# Patient Record
Sex: FEMALE | Race: WHITE | NOT HISPANIC OR LATINO | Employment: OTHER | URBAN - METROPOLITAN AREA
[De-identification: names, ages, dates, MRNs, and addresses within clinical notes are randomized per-mention and may not be internally consistent; named-entity substitution may affect disease eponyms.]

---

## 2017-08-24 ENCOUNTER — TRANSCRIBE ORDERS (OUTPATIENT)
Dept: ADMINISTRATIVE | Facility: HOSPITAL | Age: 57
End: 2017-08-24

## 2017-08-24 DIAGNOSIS — Z12.31 VISIT FOR SCREENING MAMMOGRAM: Primary | ICD-10-CM

## 2017-10-02 ENCOUNTER — HOSPITAL ENCOUNTER (OUTPATIENT)
Dept: MAMMOGRAPHY | Facility: HOSPITAL | Age: 57
Discharge: HOME | End: 2017-10-02
Attending: FAMILY MEDICINE | Admitting: FAMILY MEDICINE

## 2017-10-02 DIAGNOSIS — Z12.31 VISIT FOR SCREENING MAMMOGRAM: ICD-10-CM

## 2017-10-02 PROCEDURE — 77063 BREAST TOMOSYNTHESIS BI: CPT

## 2017-10-02 PROCEDURE — G0202 SCR MAMMO BI INCL CAD: HCPCS

## 2017-10-09 PROCEDURE — 77067 SCR MAMMO BI INCL CAD: CPT | Performed by: RADIOLOGY

## 2017-10-09 PROCEDURE — 77063 BREAST TOMOSYNTHESIS BI: CPT | Performed by: RADIOLOGY

## 2018-09-24 ENCOUNTER — TRANSCRIBE ORDERS (OUTPATIENT)
Dept: ADMINISTRATIVE | Facility: HOSPITAL | Age: 58
End: 2018-09-24

## 2018-09-24 DIAGNOSIS — Z12.31 VISIT FOR SCREENING MAMMOGRAM: Primary | ICD-10-CM

## 2018-10-16 ENCOUNTER — HOSPITAL ENCOUNTER (OUTPATIENT)
Dept: MAMMOGRAPHY | Facility: HOSPITAL | Age: 58
Discharge: HOME | End: 2018-10-16
Attending: FAMILY MEDICINE | Admitting: FAMILY MEDICINE

## 2018-10-16 DIAGNOSIS — Z12.31 VISIT FOR SCREENING MAMMOGRAM: ICD-10-CM

## 2018-10-16 PROCEDURE — 77067 SCR MAMMO BI INCL CAD: CPT

## 2018-10-16 PROCEDURE — 77067 SCR MAMMO BI INCL CAD: CPT | Performed by: RADIOLOGY

## 2018-10-16 PROCEDURE — 77063 BREAST TOMOSYNTHESIS BI: CPT

## 2018-10-16 PROCEDURE — 77063 BREAST TOMOSYNTHESIS BI: CPT | Performed by: RADIOLOGY

## 2019-10-21 ENCOUNTER — TRANSCRIBE ORDERS (OUTPATIENT)
Dept: ADMINISTRATIVE | Facility: HOSPITAL | Age: 59
End: 2019-10-21

## 2019-10-21 DIAGNOSIS — Z12.31 VISIT FOR SCREENING MAMMOGRAM: Primary | ICD-10-CM

## 2019-10-22 ENCOUNTER — HOSPITAL ENCOUNTER (OUTPATIENT)
Dept: MAMMOGRAPHY | Facility: HOSPITAL | Age: 59
Discharge: HOME | End: 2019-10-22
Admitting: FAMILY MEDICINE

## 2019-10-22 DIAGNOSIS — Z12.31 VISIT FOR SCREENING MAMMOGRAM: ICD-10-CM

## 2019-10-22 PROCEDURE — 77067 SCR MAMMO BI INCL CAD: CPT

## 2019-10-22 PROCEDURE — 77067 SCR MAMMO BI INCL CAD: CPT | Performed by: RADIOLOGY

## 2019-10-22 PROCEDURE — 77063 BREAST TOMOSYNTHESIS BI: CPT | Performed by: RADIOLOGY

## 2019-10-22 PROCEDURE — 77063 BREAST TOMOSYNTHESIS BI: CPT

## 2020-09-21 ENCOUNTER — TRANSCRIBE ORDERS (OUTPATIENT)
Dept: ADMINISTRATIVE | Facility: HOSPITAL | Age: 60
End: 2020-09-21

## 2020-09-21 DIAGNOSIS — Z12.31 VISIT FOR SCREENING MAMMOGRAM: Primary | ICD-10-CM

## 2020-12-18 ENCOUNTER — HOSPITAL ENCOUNTER (OUTPATIENT)
Dept: MAMMOGRAPHY | Facility: HOSPITAL | Age: 60
Discharge: HOME | End: 2020-12-18
Admitting: INTERNAL MEDICINE

## 2020-12-18 DIAGNOSIS — Z12.31 VISIT FOR SCREENING MAMMOGRAM: ICD-10-CM

## 2020-12-18 PROCEDURE — 77067 SCR MAMMO BI INCL CAD: CPT

## 2020-12-18 PROCEDURE — 77063 BREAST TOMOSYNTHESIS BI: CPT

## 2020-12-18 PROCEDURE — 77067 SCR MAMMO BI INCL CAD: CPT | Performed by: RADIOLOGY

## 2020-12-18 PROCEDURE — 77063 BREAST TOMOSYNTHESIS BI: CPT | Performed by: RADIOLOGY

## 2021-11-12 ENCOUNTER — TRANSCRIBE ORDERS (OUTPATIENT)
Dept: ADMINISTRATIVE | Facility: HOSPITAL | Age: 61
End: 2021-11-12

## 2021-11-12 DIAGNOSIS — Z12.31 VISIT FOR SCREENING MAMMOGRAM: Primary | ICD-10-CM

## 2022-01-03 ENCOUNTER — HOSPITAL ENCOUNTER (OUTPATIENT)
Dept: MAMMOGRAPHY | Facility: HOSPITAL | Age: 62
Discharge: HOME | End: 2022-01-03
Admitting: INTERNAL MEDICINE

## 2022-01-03 DIAGNOSIS — Z12.31 VISIT FOR SCREENING MAMMOGRAM: ICD-10-CM

## 2022-01-03 PROCEDURE — 77063 BREAST TOMOSYNTHESIS BI: CPT | Performed by: RADIOLOGY

## 2022-01-03 PROCEDURE — 77063 BREAST TOMOSYNTHESIS BI: CPT

## 2022-01-03 PROCEDURE — 77067 SCR MAMMO BI INCL CAD: CPT

## 2022-01-03 PROCEDURE — 77067 SCR MAMMO BI INCL CAD: CPT | Performed by: RADIOLOGY

## 2022-03-22 ENCOUNTER — TRANSCRIBE ORDERS (OUTPATIENT)
Dept: ADMINISTRATIVE | Facility: HOSPITAL | Age: 62
End: 2022-03-22

## 2022-03-22 DIAGNOSIS — S46.212A BICEPS MUSCLE TEAR, LEFT, INITIAL ENCOUNTER: Primary | ICD-10-CM

## 2022-04-26 ENCOUNTER — HOSPITAL ENCOUNTER (OUTPATIENT)
Dept: MRI IMAGING | Facility: HOSPITAL | Age: 62
Discharge: HOME | End: 2022-04-26
Admitting: INTERNAL MEDICINE

## 2022-04-26 DIAGNOSIS — S46.212A BICEPS MUSCLE TEAR, LEFT, INITIAL ENCOUNTER: ICD-10-CM

## 2022-04-26 PROCEDURE — 73218 MRI UPPER EXTREMITY W/O DYE: CPT

## 2022-06-28 ENCOUNTER — TELEPHONE (OUTPATIENT)
Dept: GENETICS | Facility: HOSPITAL | Age: 62
End: 2022-06-28

## 2022-06-28 NOTE — TELEPHONE ENCOUNTER
Patient returned my call. She confirmed she is bringing a copy of her sister's genetic testing results (completed through [employer]) to her genetic appt on 7/5 2:30pm.

## 2022-07-05 ENCOUNTER — HOSPITAL ENCOUNTER (OUTPATIENT)
Dept: MRI IMAGING | Facility: HOSPITAL | Age: 62
Discharge: HOME | End: 2022-07-05

## 2022-07-05 ENCOUNTER — LAB (OUTPATIENT)
Dept: LAB | Facility: HOSPITAL | Age: 62
End: 2022-07-05

## 2022-07-05 ENCOUNTER — CLINICAL SUPPORT (OUTPATIENT)
Dept: GENETICS | Facility: HOSPITAL | Age: 62
End: 2022-07-05

## 2022-07-05 DIAGNOSIS — Z13.79 GENETIC TESTING: Primary | ICD-10-CM

## 2022-07-05 DIAGNOSIS — Z80.3 FAMILY HISTORY OF MALIGNANT NEOPLASM OF BREAST: ICD-10-CM

## 2022-07-05 LAB — CREAT BLDA-MCNC: 0.7 MG/DL (ref 0.6–1.3)

## 2022-07-05 PROCEDURE — A9577 INJ MULTIHANCE: HCPCS | Performed by: INTERNAL MEDICINE

## 2022-07-05 PROCEDURE — 96040: CPT | Performed by: GENETIC COUNSELOR, MS

## 2022-07-05 PROCEDURE — 77049 MRI BREAST C-+ W/CAD BI: CPT | Performed by: RADIOLOGY

## 2022-07-05 PROCEDURE — 0 GADOBENATE DIMEGLUMINE 529 MG/ML SOLUTION: Performed by: INTERNAL MEDICINE

## 2022-07-05 PROCEDURE — 77049 MRI BREAST C-+ W/CAD BI: CPT

## 2022-07-05 PROCEDURE — 82565 ASSAY OF CREATININE: CPT

## 2022-07-05 RX ADMIN — GADOBENATE DIMEGLUMINE 12 ML: 529 INJECTION, SOLUTION INTRAVENOUS at 08:59

## 2022-07-05 NOTE — PROGRESS NOTES
Kamran Francisco, a 62-year-old female, was referred for genetic counseling due to a family history of breast cancer. She was 12 years old at menarche and is nulliparous. She retains her uterus and ovaries. Her current cancer screening includes annual clinical breast exam, annual mammogram, and colonoscopy every ten years. She had a breast MRI today. She has never had a breast biopsy and she has never had colon polyps. She was interested in discussing her risk for a hereditary cancer syndrome.  Ms. Francisco was interested in pursuing a multigene panel, and therefore the CancerNext panel was ordered through [facility] which analyzes BRCA1/2 and 34 additional genes associated with an increased cancer risk. Results are expected in 2-3 weeks.     FAMILY HISTORY (see attached pedigree):   Sister:    Breast cancer (bilateral), 56; negative BRCA1/2 genetic testing (CancerNext panel)  Sister:    Breast cancer, 55  Mat. Great Aunt:  Breast cancer  Mat. Great Aunt:  Breast cancer  Mat. Great Grandmother: Breast cancer    We do not have medical records confirming the diagnoses in Ms. Francisco’s family.    RISK ASSESSMENT:  Ms. Francisco’s family history of breast cancer led to concern regarding a hereditary cancer syndrome.  She meets NCCN guidelines criteria for BRCA1/2 testing based on her family history of breast cancer in at least three close relatives. The standard approach to genetic testing is through a multigene panel. If genetic testing is negative, Ms. Francisco’s management should be guided by family history. One of Ms. Francisco’s sisters has had negative multigene panel testing, reducing the likelihood for a hereditary cancer syndrome in the family. However, she still meets criteria for testing since other affected relatives have not had genetic testing.     GENETIC COUNSELING (30 minutes):  We reviewed the family history information in detail.  Cases of cancer follow three general patterns: sporadic, familial, and  hereditary.  While most cancer is sporadic, some cases appear to occur in family clusters.  These cases are said to be familial and account for 10-20% of certain cancer cases.  Familial cases may be due to a combination of shared genes and environmental factors among family members.  In even fewer families, the cancer is said to be inherited, and the genes responsible for the cancer are known.      Family histories typical of hereditary cancer syndromes usually include multiple first- and second-degree relatives diagnosed with cancer types that define a syndrome.  These cases tend to be diagnosed at younger-than-expected ages and can be bilateral or multifocal.  The cancer in these families follows an autosomal dominant inheritance pattern, which indicates the likely presence of a mutation in a cancer susceptibility gene.  Children and siblings of an individual believed to carry this mutation have a 50% chance of inheriting that mutation, thereby inheriting the increased risk to develop cancer.  These mutations can be passed down from the maternal or the paternal lineage.    Based on Ms. Francisco’s family history, we discussed that hereditary breast cancer accounts for approximately 5-10% of all cases of breast cancer.  A significant proportion of hereditary breast and ovarian cancer can be attributed to mutations in the BRCA1 and BRCA2 genes.  Mutations in these genes confer an increased risk for breast cancer, ovarian cancer, male breast cancer, prostate cancer, and pancreatic cancer. Women with a BRCA1 or BRCA2 mutation have up to an 87% lifetime risk of breast cancer and up to a 44% risk of ovarian cancer. These genes are not responsible for every case of hereditary breast cancer, and we discussed multigene panels that can evaluate BRCA1/2 and a number of additional cancer related genes simultaneously. The standard approach to genetic testing is via a multigene panel.  Genes included on these panels have varying  degrees of risk associated, and management and screening guidelines vary based on the specific gene.  Hereditary cancer syndromes can demonstrate incomplete penetrance and variable expression within families. There are genes that are evaluated that have been more recently described, and there may be less data regarding the risks and therefore may not have established management guidelines at this time. Based on Ms. Francisco’s family history and her desire to get more information regarding her personal risks she opted to pursue testing through a panel evaluating several other genes known to increase the risk for cancer.    GENETIC TESTING:  The risks, benefits and limitations of genetic testing and implications for clinical management following testing were reviewed. DNA test results can influence decisions regarding screening and prevention.  Genetic testing can have significant psychological implications for both individuals and families. Also discussed was the possibility of employment and insurance discrimination based on genetic test results and the federal and states laws that are in place to prevent this (KARLIE).         We discussed multigene panel testing, which would involve testing BRCA1/2 and an additional 34 genes associated with an increased cancer risk. The benefits and limitations of genetic testing were discussed and Ms. Francisco decided to pursue testing of the genes via the panel. The implications of a positive or negative test result were discussed.  We also discussed the importance of testing on an affected relative.  In cases where an affected relative is not available for testing or not willing to pursue testing, it is appropriate to offer testing to an unaffected individual. We discussed the possibility that, in some cases, genetic test results may be ambiguous due to the identification of a genetic variant. These variants may or may not be associated with an increased cancer risk. With multigene  panel testing, it is not uncommon for a variant of uncertain significance (VUS) to be identified.  If a VUS is identified, testing family members is not recommended and screening recommendations are made based on the family history.  The laboratories that perform genetic testing work to reclassify the VUS and send out an amended report if and when a VUS is reclassified.  The majority of variant findings are ultimately reclassified to a negative result. Given her family history, a negative test result does not eliminate all cancer risk, although the risk would not be as high as it would with positive genetic testing.     PLAN:  Genetic testing via the CancerNext panel through [employer] [facility] was ordered. Results are expected in 2-3 weeks and we will contact Ms. Medford with her results once they are received.      Delmi Wasserman MS, [facility] [place], [facility]  Licensed Certified Genetic Counselor

## 2022-07-08 ENCOUNTER — TELEPHONE (OUTPATIENT)
Dept: MRI IMAGING | Facility: HOSPITAL | Age: 62
End: 2022-07-08

## 2022-07-08 NOTE — TELEPHONE ENCOUNTER
Called patient with MRI Breast results. Recommended Rt Dx MMG and Stereo BX scheduled for 8/4 at 9:00 at the [address]. Pt to arrive 15 min early. Pt not on BT.l Pt expressed understanding and was encouraged to call with any further questions or concerns.

## 2022-07-12 ENCOUNTER — APPOINTMENT (OUTPATIENT)
Dept: ULTRASOUND IMAGING | Facility: HOSPITAL | Age: 62
End: 2022-07-12

## 2022-07-19 ENCOUNTER — DOCUMENTATION (OUTPATIENT)
Dept: GENETICS | Facility: HOSPITAL | Age: 62
End: 2022-07-19

## 2022-07-19 NOTE — PROGRESS NOTES
Kamran Francisco, a 62-year-old female, was referred for genetic counseling due to a family history of breast cancer. 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 She was 12 years old at menarche and is nulliparous. She retains her uterus and ovaries. Her current cancer screening includes annual clinical breast exam, annual mammogram, and colonoscopy every ten years. She had a breast MRI on the same day as her initial genetic counseling appointment. She has never had a breast biopsy and she has never had colon polyps. She was interested in discussing her risk for a hereditary cancer syndrome.  Ms. Francisco was interested in pursuing a multigene panel, and therefore the CancerNext panel was ordered through [employer] which analyzes BRCA1/2 and 34 additional genes associated with an increased cancer risk. The genes on this panel include APC, RIRI, AXIN2, BARD1, BMPR1A, BRCA1, BRCA2, BRIP1, CDH1, CDK4, CDKN2A, CHEK2, DICER1, EPCAM, GREM1, HOXB13, MLH1, MSH2, MSH3, MSH6, MUTYH, NBN, NF1, NTHL1, PALB2, PMS2, POLD1, POLE, PTEN, RAD51C, RAD51D, RECQL, SMAD4, SMARCA4, STK11, and TP53.  Genetic testing was negative for pathogenic mutations in BRCA1/2 and 34 additional genes on the CancerNext panel. These normal results were discussed with Ms. Francisco by telephone on 7/19/22.    FAMILY HISTORY (see attached pedigree):   Sister:    Breast cancer (bilateral), 56; negative BRCA1/2 genetic testing (CancerNext panel)  Sister:    Breast cancer, 55  Mat. Great Aunt:  Breast cancer  Mat. Great Aunt:  Breast cancer  Mat. Great Grandmother: Breast cancer    We do not have medical records confirming the diagnoses in Ms. Francisco’s family.    RISK ASSESSMENT:  Ms. Francisco’s family history of breast cancer led to concern regarding a hereditary cancer syndrome.  She meets NCCN guidelines criteria for BRCA1/2 testing based on her family history of breast cancer in at least three close  relatives. The standard approach to genetic testing is through a multigene panel. If genetic testing is negative, Ms. Francisco’s management should be guided by family history. One of Ms. Francisco’s sisters has had negative multigene panel testing, reducing the likelihood for a hereditary cancer syndrome in the family. However, she still meets criteria for testing since other affected relatives have not had genetic testing.     Because genetic testing was negative, Ms. Francisco’s management should be guided by a family history-based risk assessment. Tyrer-Cuzick, version 8 is able to take into account personal factors and family history when calculating risk for breast cancer. Computer modeling estimates that Ms. Francisco’s lifetime personal risk for developing breast cancer is 20% (Tyrer-Cuzick, v8), compared to the general population risk of 12.5%. A risk of 20% or greater warrants consideration of increased screening per NCCN guidelines.  This risk assessment is based on the family history information provided at the time of the appointment and could change in the future should new information be obtained.    GENETIC COUNSELING:  We reviewed the family history information in detail.  Cases of cancer follow three general patterns: sporadic, familial, and hereditary.  While most cancer is sporadic, some cases appear to occur in family clusters.  These cases are said to be familial and account for 10-20% of certain cancer cases.  Familial cases may be due to a combination of shared genes and environmental factors among family members.  In even fewer families, the cancer is said to be inherited, and the genes responsible for the cancer are known.      Family histories typical of hereditary cancer syndromes usually include multiple first- and second-degree relatives diagnosed with cancer types that define a syndrome.  These cases tend to be diagnosed at younger-than-expected ages and can be bilateral or multifocal.  The  cancer in these families follows an autosomal dominant inheritance pattern, which indicates the likely presence of a mutation in a cancer susceptibility gene.  Children and siblings of an individual believed to carry this mutation have a 50% chance of inheriting that mutation, thereby inheriting the increased risk to develop cancer.  These mutations can be passed down from the maternal or the paternal lineage.    Based on Ms. Francisco’s family history, we discussed that hereditary breast cancer accounts for approximately 5-10% of all cases of breast cancer.  A significant proportion of hereditary breast and ovarian cancer can be attributed to mutations in the BRCA1 and BRCA2 genes.  Mutations in these genes confer an increased risk for breast cancer, ovarian cancer, male breast cancer, prostate cancer, and pancreatic cancer. Women with a BRCA1 or BRCA2 mutation have up to an 87% lifetime risk of breast cancer and up to a 44% risk of ovarian cancer. These genes are not responsible for every case of hereditary breast cancer, and we discussed multigene panels that can evaluate BRCA1/2 and a number of additional cancer related genes simultaneously. The standard approach to genetic testing is via a multigene panel.  Genes included on these panels have varying degrees of risk associated, and management and screening guidelines vary based on the specific gene.  Hereditary cancer syndromes can demonstrate incomplete penetrance and variable expression within families. There are genes that are evaluated that have been more recently described, and there may be less data regarding the risks and therefore may not have established management guidelines at this time. Based on Ms. Francisco’s family history and her desire to get more information regarding her personal risks she opted to pursue testing through a panel evaluating several other genes known to increase the risk for cancer.    GENETIC TESTING:  The risks, benefits and  limitations of genetic testing and implications for clinical management following testing were reviewed. DNA test results can influence decisions regarding screening and prevention.  Genetic testing can have significant psychological implications for both individuals and families. Also discussed was the possibility of employment and insurance discrimination based on genetic test results and the federal and states laws that are in place to prevent this (KARLIE).         We discussed multigene panel testing, which would involve testing BRCA1/2 and an additional 34 genes associated with an increased cancer risk. The benefits and limitations of genetic testing were discussed and Ms. Francisco decided to pursue testing of the genes via the panel. The implications of a positive or negative test result were discussed.  We also discussed the importance of testing on an affected relative.  In cases where an affected relative is not available for testing or not willing to pursue testing, it is appropriate to offer testing to an unaffected individual. We discussed the possibility that, in some cases, genetic test results may be ambiguous due to the identification of a genetic variant. These variants may or may not be associated with an increased cancer risk. With multigene panel testing, it is not uncommon for a variant of uncertain significance (VUS) to be identified.  If a VUS is identified, testing family members is not recommended and screening recommendations are made based on the family history.  The laboratories that perform genetic testing work to reclassify the VUS and send out an amended report if and when a VUS is reclassified.  The majority of variant findings are ultimately reclassified to a negative result. Given her family history, a negative test result does not eliminate all cancer risk, although the risk would not be as high as it would with positive genetic testing.     TEST RESULTS: Genetic testing was negative  for known pathogenic mutations by sequencing, rearrangement testing, and RNA analysis for the 36 genes on the CancerNext panel (see attached).  This negative result greatly lowers, but does not eliminate, the risk of a hereditary cancer syndrome for Ms. Francisco. It is possible that the family history is due to a hereditary cancer syndrome that Ms. Francisco did not happen to inherit. This assessment is based on the information provided at the time of the consultation.     CANCER SCREENING: Options available to individuals with a high lifetime risk for breast cancer were discussed, including increased surveillance. Given her family history, Ms. Francisco is at an increased lifetime risk for breast cancer, which warrants increased surveillance.     Increased surveillance, based on NCCN guidelines, would consist of semi-annual clinical breast exams and monthly self-breast exams starting by age 18 and annual mammography starting 10 years younger than the earliest diagnosis in the family, or by age 40, whichever is earliest. According to an American Cancer Society expert panel and NCCN guidelines, annual breast MRI should be offered to women whose lifetime risk of breast cancer is 20-25 percent or more, typically beginning at the same age as mammography.  Breast cancer chemoprevention is another option that can be considered in the future.  Studies have shown that Tamoxifen and Raloxifene can cut the risk of estrogen receptor positive breast cancer by up to 50% when taken by high-risk women over a 5-year period.  These are not recommended before age 35.  There are risks and side effects associated with these medications; therefore, the risks versus benefits must be considered prior to deciding to take chemopreventative medications. This assessment is based on the family history information provided at the time of the appointment.     PLAN: Genetic counseling remains available to Ms. Francisco. She plans to follow up with her  primary care physician for coordination of increased breast screening. She is encouraged to contact our office with any questions, concerns, or updates to the family history at [phone].      Delmi Juarez MS, [facility] [place], [facility]  Licensed Certified Genetic Counselor      Cc: Kamran Stallworth MD

## 2022-08-04 ENCOUNTER — HOSPITAL ENCOUNTER (OUTPATIENT)
Dept: MAMMOGRAPHY | Facility: HOSPITAL | Age: 62
Discharge: HOME | End: 2022-08-04

## 2022-08-04 ENCOUNTER — APPOINTMENT (OUTPATIENT)
Dept: ULTRASOUND IMAGING | Facility: HOSPITAL | Age: 62
End: 2022-08-04

## 2022-08-04 DIAGNOSIS — Z80.3 FAMILY HISTORY OF MALIGNANT NEOPLASM OF BREAST: ICD-10-CM

## 2022-08-04 DIAGNOSIS — R92.8 ABNORMAL MAGNETIC RESONANCE IMAGING OF BREAST: ICD-10-CM

## 2022-08-04 PROCEDURE — 88305 TISSUE EXAM BY PATHOLOGIST: CPT | Performed by: RADIOLOGY

## 2022-08-04 PROCEDURE — G0279 TOMOSYNTHESIS, MAMMO: HCPCS

## 2022-08-04 PROCEDURE — 77065 DX MAMMO INCL CAD UNI: CPT | Performed by: RADIOLOGY

## 2022-08-04 PROCEDURE — 0 LIDOCAINE 1 % SOLUTION: Performed by: RADIOLOGY

## 2022-08-04 PROCEDURE — 77061 BREAST TOMOSYNTHESIS UNI: CPT | Performed by: RADIOLOGY

## 2022-08-04 PROCEDURE — A4648 IMPLANTABLE TISSUE MARKER: HCPCS

## 2022-08-04 PROCEDURE — 76098 X-RAY EXAM SURGICAL SPECIMEN: CPT

## 2022-08-04 PROCEDURE — 77065 DX MAMMO INCL CAD UNI: CPT

## 2022-08-04 PROCEDURE — 19081 BX BREAST 1ST LESION STRTCTC: CPT | Performed by: RADIOLOGY

## 2022-08-04 RX ORDER — LIDOCAINE HYDROCHLORIDE AND EPINEPHRINE 10; 10 MG/ML; UG/ML
20 INJECTION, SOLUTION INFILTRATION; PERINEURAL ONCE
Status: COMPLETED | OUTPATIENT
Start: 2022-08-04 | End: 2022-08-04

## 2022-08-04 RX ORDER — LIDOCAINE HYDROCHLORIDE 10 MG/ML
5 INJECTION, SOLUTION INFILTRATION; PERINEURAL ONCE
Status: COMPLETED | OUTPATIENT
Start: 2022-08-04 | End: 2022-08-04

## 2022-08-04 RX ADMIN — LIDOCAINE HYDROCHLORIDE AND EPINEPHRINE 11 ML: 10; 10 INJECTION, SOLUTION INFILTRATION; PERINEURAL at 12:09

## 2022-08-04 RX ADMIN — Medication 5 ML: at 11:50

## 2022-08-04 NOTE — PROGRESS NOTES
Alert and orientated. Denies discomfort.,No active bleeding.,Steri-strips not visualized, gauze dressing intact. Ice packs given. Verbalizes and demonstrates understanding of post-care instructions, written copy given.

## 2022-08-05 ENCOUNTER — TELEPHONE (OUTPATIENT)
Dept: MRI IMAGING | Facility: HOSPITAL | Age: 62
End: 2022-08-05

## 2022-08-05 ENCOUNTER — TELEPHONE (OUTPATIENT)
Dept: MAMMOGRAPHY | Facility: HOSPITAL | Age: 62
End: 2022-08-05

## 2022-08-05 LAB
CYTO UR: NORMAL
LAB AP CASE REPORT: NORMAL
LAB AP DIAGNOSIS COMMENT: NORMAL
PATH REPORT.FINAL DX SPEC: NORMAL
PATH REPORT.GROSS SPEC: NORMAL

## 2022-08-05 NOTE — TELEPHONE ENCOUNTER
Patient notified of pathology results and recommendation. Verbalizes understanding. Denies discomfort. Denies signs and symptoms of infection. Questions answered, verbalized understanding.  Patient is aware she will be called with an appointment.

## 2022-09-23 ENCOUNTER — APPOINTMENT (OUTPATIENT)
Dept: MAMMOGRAPHY | Facility: HOSPITAL | Age: 62
End: 2022-09-23

## 2022-09-23 ENCOUNTER — HOSPITAL ENCOUNTER (OUTPATIENT)
Dept: MRI IMAGING | Facility: HOSPITAL | Age: 62
Discharge: HOME | End: 2022-09-23
Admitting: INTERNAL MEDICINE

## 2022-09-23 DIAGNOSIS — R92.8 ABNORMAL FINDINGS ON DIAGNOSTIC IMAGING OF BREAST: ICD-10-CM

## 2022-09-23 LAB — CREAT BLDA-MCNC: 0.9 MG/DL (ref 0.6–1.3)

## 2022-09-23 PROCEDURE — A9577 INJ MULTIHANCE: HCPCS | Performed by: INTERNAL MEDICINE

## 2022-09-23 PROCEDURE — 0 GADOBENATE DIMEGLUMINE 529 MG/ML SOLUTION: Performed by: INTERNAL MEDICINE

## 2022-09-23 PROCEDURE — 82565 ASSAY OF CREATININE: CPT

## 2022-09-23 RX ADMIN — GADOBENATE DIMEGLUMINE 13 ML: 529 INJECTION, SOLUTION INTRAVENOUS at 14:08

## 2023-01-17 ENCOUNTER — TRANSCRIBE ORDERS (OUTPATIENT)
Dept: MAMMOGRAPHY | Facility: HOSPITAL | Age: 63
End: 2023-01-17
Payer: COMMERCIAL

## 2023-01-17 DIAGNOSIS — R92.8 ABNORMAL MAMMOGRAM: Primary | ICD-10-CM

## 2023-01-26 ENCOUNTER — TELEPHONE (OUTPATIENT)
Dept: MRI IMAGING | Facility: HOSPITAL | Age: 63
End: 2023-01-26
Payer: COMMERCIAL

## 2023-01-26 NOTE — TELEPHONE ENCOUNTER
Pt having MMG done 2/23 and will plan on getting her MRI Breast in Aug. 2023. She see Dr. Stallworth in March and will request an order.

## 2023-02-08 ENCOUNTER — HOSPITAL ENCOUNTER (OUTPATIENT)
Dept: MAMMOGRAPHY | Facility: HOSPITAL | Age: 63
Discharge: HOME | End: 2023-02-08
Admitting: INTERNAL MEDICINE
Payer: COMMERCIAL

## 2023-02-08 DIAGNOSIS — R92.8 ABNORMAL MAMMOGRAM: ICD-10-CM

## 2023-02-08 PROCEDURE — 77066 DX MAMMO INCL CAD BI: CPT | Performed by: RADIOLOGY

## 2023-02-08 PROCEDURE — 77066 DX MAMMO INCL CAD BI: CPT

## 2023-02-08 PROCEDURE — 77062 BREAST TOMOSYNTHESIS BI: CPT | Performed by: RADIOLOGY

## 2023-02-08 PROCEDURE — G0279 TOMOSYNTHESIS, MAMMO: HCPCS

## 2023-03-13 ENCOUNTER — TRANSCRIBE ORDERS (OUTPATIENT)
Dept: ADMINISTRATIVE | Facility: HOSPITAL | Age: 63
End: 2023-03-13
Payer: COMMERCIAL

## 2023-03-13 DIAGNOSIS — R92.8 ABNORMAL MRI, BREAST: Primary | ICD-10-CM

## 2023-03-22 ENCOUNTER — HOSPITAL ENCOUNTER (OUTPATIENT)
Dept: MRI IMAGING | Facility: HOSPITAL | Age: 63
Discharge: HOME | End: 2023-03-22
Admitting: INTERNAL MEDICINE
Payer: COMMERCIAL

## 2023-03-22 DIAGNOSIS — R92.8 ABNORMAL MRI, BREAST: ICD-10-CM

## 2023-03-22 LAB — CREAT BLDA-MCNC: 0.7 MG/DL (ref 0.6–1.3)

## 2023-03-22 PROCEDURE — 77049 MRI BREAST C-+ W/CAD BI: CPT | Performed by: RADIOLOGY

## 2023-03-22 PROCEDURE — 77049 MRI BREAST C-+ W/CAD BI: CPT

## 2023-03-22 PROCEDURE — 82565 ASSAY OF CREATININE: CPT

## 2023-03-22 PROCEDURE — 0 GADOBENATE DIMEGLUMINE 529 MG/ML SOLUTION: Performed by: INTERNAL MEDICINE

## 2023-03-22 PROCEDURE — A9577 INJ MULTIHANCE: HCPCS | Performed by: INTERNAL MEDICINE

## 2023-03-22 RX ADMIN — GADOBENATE DIMEGLUMINE 13 ML: 529 INJECTION, SOLUTION INTRAVENOUS at 10:17

## 2023-10-19 ENCOUNTER — TRANSCRIBE ORDERS (OUTPATIENT)
Dept: ADMINISTRATIVE | Facility: HOSPITAL | Age: 63
End: 2023-10-19
Payer: COMMERCIAL

## 2023-10-19 DIAGNOSIS — K63.89 COLONIC MASS: Primary | ICD-10-CM

## 2023-10-27 ENCOUNTER — TRANSCRIBE ORDERS (OUTPATIENT)
Dept: ADMINISTRATIVE | Facility: HOSPITAL | Age: 63
End: 2023-10-27
Payer: COMMERCIAL

## 2023-10-27 DIAGNOSIS — D37.4 NEOPLASM OF UNCERTAIN BEHAVIOR OF COLON: Primary | ICD-10-CM

## 2023-10-27 DIAGNOSIS — K63.89 MASS OF COLON: ICD-10-CM

## 2023-11-21 ENCOUNTER — HOSPITAL ENCOUNTER (OUTPATIENT)
Dept: CT IMAGING | Facility: HOSPITAL | Age: 63
Discharge: HOME | End: 2023-11-21
Admitting: STUDENT IN AN ORGANIZED HEALTH CARE EDUCATION/TRAINING PROGRAM
Payer: COMMERCIAL

## 2023-11-21 DIAGNOSIS — K63.89 MASS OF COLON: ICD-10-CM

## 2023-11-21 LAB — CREAT BLDA-MCNC: 0.9 MG/DL (ref 0.6–1.3)

## 2023-11-21 PROCEDURE — 82565 ASSAY OF CREATININE: CPT

## 2023-11-21 PROCEDURE — 25510000001 IOPAMIDOL 61 % SOLUTION: Performed by: STUDENT IN AN ORGANIZED HEALTH CARE EDUCATION/TRAINING PROGRAM

## 2023-11-21 PROCEDURE — 74177 CT ABD & PELVIS W/CONTRAST: CPT

## 2023-11-21 RX ADMIN — IOPAMIDOL 85 ML: 612 INJECTION, SOLUTION INTRAVENOUS at 16:10

## 2023-12-08 ENCOUNTER — CONSULT (OUTPATIENT)
Dept: ONCOLOGY | Facility: CLINIC | Age: 63
End: 2023-12-08
Payer: COMMERCIAL

## 2023-12-08 ENCOUNTER — LAB (OUTPATIENT)
Dept: LAB | Facility: HOSPITAL | Age: 63
End: 2023-12-08
Payer: COMMERCIAL

## 2023-12-08 VITALS
DIASTOLIC BLOOD PRESSURE: 92 MMHG | SYSTOLIC BLOOD PRESSURE: 164 MMHG | TEMPERATURE: 98.5 F | HEIGHT: 65 IN | WEIGHT: 143 LBS | HEART RATE: 88 BPM | BODY MASS INDEX: 23.82 KG/M2 | OXYGEN SATURATION: 98 %

## 2023-12-08 DIAGNOSIS — K76.9 LIVER LESION: Primary | ICD-10-CM

## 2023-12-08 DIAGNOSIS — M89.9 BONE LESION: ICD-10-CM

## 2023-12-08 DIAGNOSIS — K76.9 LIVER LESION: ICD-10-CM

## 2023-12-08 LAB
ALBUMIN SERPL-MCNC: 4.5 G/DL (ref 3.5–5.2)
ALBUMIN/GLOB SERPL: 1.5 G/DL
ALP SERPL-CCNC: 85 U/L (ref 39–117)
ALT SERPL W P-5'-P-CCNC: 18 U/L (ref 1–33)
ANION GAP SERPL CALCULATED.3IONS-SCNC: 10 MMOL/L (ref 5–15)
AST SERPL-CCNC: 24 U/L (ref 1–32)
BASOPHILS # BLD AUTO: 0.09 10*3/MM3 (ref 0–0.2)
BASOPHILS NFR BLD AUTO: 0.9 % (ref 0–1.5)
BILIRUB SERPL-MCNC: 0.6 MG/DL (ref 0–1.2)
BUN SERPL-MCNC: 15 MG/DL (ref 8–23)
BUN/CREAT SERPL: 18.5 (ref 7–25)
CALCIUM SPEC-SCNC: 9.4 MG/DL (ref 8.6–10.5)
CHLORIDE SERPL-SCNC: 106 MMOL/L (ref 98–107)
CO2 SERPL-SCNC: 27 MMOL/L (ref 22–29)
CREAT SERPL-MCNC: 0.81 MG/DL (ref 0.57–1)
DEPRECATED RDW RBC AUTO: 45.7 FL (ref 37–54)
EGFRCR SERPLBLD CKD-EPI 2021: 81.7 ML/MIN/1.73
EOSINOPHIL # BLD AUTO: 0.3 10*3/MM3 (ref 0–0.4)
EOSINOPHIL NFR BLD AUTO: 3.1 % (ref 0.3–6.2)
ERYTHROCYTE [DISTWIDTH] IN BLOOD BY AUTOMATED COUNT: 13 % (ref 12.3–15.4)
GLOBULIN UR ELPH-MCNC: 3 GM/DL
GLUCOSE SERPL-MCNC: 98 MG/DL (ref 65–99)
HCT VFR BLD AUTO: 41.2 % (ref 34–46.6)
HGB BLD-MCNC: 13 G/DL (ref 12–15.9)
IMM GRANULOCYTES # BLD AUTO: 0.02 10*3/MM3 (ref 0–0.05)
IMM GRANULOCYTES NFR BLD AUTO: 0.2 % (ref 0–0.5)
LYMPHOCYTES # BLD AUTO: 4.01 10*3/MM3 (ref 0.7–3.1)
LYMPHOCYTES NFR BLD AUTO: 41 % (ref 19.6–45.3)
MCH RBC QN AUTO: 30.2 PG (ref 26.6–33)
MCHC RBC AUTO-ENTMCNC: 31.6 G/DL (ref 31.5–35.7)
MCV RBC AUTO: 95.8 FL (ref 79–97)
MONOCYTES # BLD AUTO: 0.95 10*3/MM3 (ref 0.1–0.9)
MONOCYTES NFR BLD AUTO: 9.7 % (ref 5–12)
NEUTROPHILS NFR BLD AUTO: 4.4 10*3/MM3 (ref 1.7–7)
NEUTROPHILS NFR BLD AUTO: 45.1 % (ref 42.7–76)
NRBC BLD AUTO-RTO: 0 /100 WBC (ref 0–0.2)
PLATELET # BLD AUTO: 258 10*3/MM3 (ref 140–450)
PMV BLD AUTO: 11.7 FL (ref 6–12)
POTASSIUM SERPL-SCNC: 3.8 MMOL/L (ref 3.5–5.2)
PROT SERPL-MCNC: 7.5 G/DL (ref 6–8.5)
RBC # BLD AUTO: 4.3 10*6/MM3 (ref 3.77–5.28)
SODIUM SERPL-SCNC: 143 MMOL/L (ref 136–145)
WBC NRBC COR # BLD AUTO: 9.77 10*3/MM3 (ref 3.4–10.8)

## 2023-12-08 PROCEDURE — 36415 COLL VENOUS BLD VENIPUNCTURE: CPT

## 2023-12-08 PROCEDURE — 82378 CARCINOEMBRYONIC ANTIGEN: CPT

## 2023-12-08 PROCEDURE — 80053 COMPREHEN METABOLIC PANEL: CPT

## 2023-12-08 PROCEDURE — 85025 COMPLETE CBC W/AUTO DIFF WBC: CPT

## 2023-12-08 PROCEDURE — 99204 OFFICE O/P NEW MOD 45 MIN: CPT | Performed by: INTERNAL MEDICINE

## 2023-12-08 RX ORDER — FLUOXETINE 10 MG/1
CAPSULE ORAL
COMMUNITY
Start: 2023-01-07

## 2023-12-08 RX ORDER — ATORVASTATIN CALCIUM 10 MG/1
TABLET, FILM COATED ORAL
COMMUNITY
Start: 2023-07-07

## 2023-12-08 NOTE — PROGRESS NOTES
New Patient Office Visit      Date: 2023     Patient Name: Kamran Francisco  MRN: [id number]  : 1960  Referring Physician: Robert Briones    Chief Complaint: Establish care for liver lesions and right hip lesion    History of Present Illness: Kamran Francisco is a pleasant 63 y.o. female with a past medical history of hyperlipidemia and anxiety who presents today for evaluation of liver and bone lesion. The patient is accompanied by their sister who contributes to the history of their care.  Patient underwent screening colonoscopy in 2023.  There was a abnormal appearance of the right colon with 1 fold distal to the ileocecal valve was concerning for submucosal lesion.  She underwent a CT abdomen/pelvis which noted 3 sclerotic lesions in the right iliac wing as well as some small lesions in the liver that were indeterminate for potentially hemangiomas versus potentially metastasis.  She is anxious about the scans but otherwise doing well.  Denies any pain or discomfort.  Denies any significant smoking history.  Up-to-date on mammograms and high risk breast MRIs.  Otherwise she feels well has no major concerns or complaints    Oncology History:    Oncology/Hematology History    No history exists.       Subjective      Review of Systems:     Constitutional: Negative for fevers, chills, or weight loss  Eyes: Negative for blurred vision or discharge         Ear/Nose/Throat: Negative for difficulty swallowing, sore throat, LAD                                                       Respiratory: Negative for cough, SOA, wheezing                                                                                        Cardiovascular: Negative for chest pain or palpitations                                                                  Gastrointestinal: Negative for nausea, vomiting or diarrhea                                                                     Genitourinary: Negative for  "dysuria or hematuria                                                                                           Musculoskeletal: Negative for any joint pains or muscle aches                                                                        Neurologic: Negative for any weakness, headaches, dizziness                                                                         Hematologic: Negative for any easy bleeding or bruising                                                                                   Psychiatric: Negative for anxiety or depression                             Past Medical History: No past medical history on file.    Past Surgical History:   Past Surgical History:   Procedure Laterality Date    BREAST BIOPSY Right 08/04/2022    Stereo bx       Family History:   Family History   Problem Relation Age of Onset    Breast cancer Sister 52    Breast cancer Sister 55    Breast cancer Maternal Great-Grandmother         DX AGE UNKNOWN    Ovarian cancer Neg Hx        Social History:   Social History     Socioeconomic History    Marital status:    Tobacco Use    Smoking status: Former     Types: Cigarettes    Smokeless tobacco: Never   Vaping Use    Vaping Use: Never used   Substance and Sexual Activity    Alcohol use: Yes     Comment: rare    Drug use: Defer    Sexual activity: Defer       Medications:     Current Outpatient Medications:     atorvastatin (LIPITOR) 10 MG tablet, , Disp: , Rfl:     FLUoxetine (PROzac) 10 MG capsule, , Disp: , Rfl:     Allergies:   No Known Allergies    Objective     Physical Exam:  Vital Signs:   Vitals:    12/08/23 0812   BP: 164/92   Pulse: 88   Temp: 98.5 °F (36.9 °C)   TempSrc: Temporal   SpO2: 98%   Weight: 64.9 kg (143 lb)   Height: 165.1 cm (65\")  Comment: per pt   PainSc: 0-No pain     Pain Score    12/08/23 0812   PainSc: 0-No pain     ECOG Performance Status: 0 - Asymptomatic    Constitutional: NAD, ECOG 0  Eyes: PERRLA, scleral anicteric  ENT: No LAD, no " thyromegaly  Respiratory: CTAB, no wheezing, rales, rhonchi  Cardiovascular: RRR, no murmurs, pulses 2+ bilaterally  Abdomen: soft, NT/ND, no HSM  Musculoskeletal: strength 5/5 bilaterally, no c/c/e  Neurologic: A&O x 3, CN II-XII intact grossly  Psych: mood and affect congruent, no SI or HI    Results Review:   No visits with results within 2 Week(s) from this visit.   Latest known visit with results is:   Hospital Outpatient Visit on 11/21/2023   Component Date Value Ref Range Status    Creatinine 11/21/2023 0.90  0.60 - 1.30 mg/dL Final    Serial Number: [id number]:  [id number]       CT Abdomen Pelvis With Contrast    Result Date: 11/22/2023  Narrative: CT ABDOMEN PELVIS W CONTRAST Date of Exam: 11/21/2023 3:55 PM EST Indication: D37.4. Colonic mass on CT colonoscopy. Comparison: None available. Technique: Axial CT images were obtained of the abdomen and pelvis following the uneventful intravenous administration of Isovue-300, 85 mL and Redicat 450 mL orally. Reconstructed coronal and sagittal images were also obtained. Automated exposure control and iterative construction methods were used. Findings: Lung Bases: The visualized lung bases and lower mediastinal structures are unremarkable. Peritoneum: No free intraperitoneal air or fluid. Abdominal wall: Unremarkable. Liver: The liver is within normal limits in size and contour. There is a 1.3 cm homogeneously hyperenhancing lesion in the posterior hepatic lobe, and the inferior portion of hepatic segment 7. More inferiorly, in segment 6, there is a second homogeneously hyperenhancing lesion measuring 1 cm. There is adjacent capsular retraction. Surrounding poorly defined hyperenhancement is also visualized possibly on the basis of altered perfusion. There is a third exophytic subcapsular slightly hypoenhancing lesion off of the posterior aspect of the right hepatic lobe measuring 2.3 cm. The portal vein is patent. Biliary/Gallbladder: 1.8 cm gallstone is  visualized. No pericholecystic inflammatory changes are visualized. The gallbladder is not abnormally distended. The biliary tree is nondilated. Pancreas: Pancreas is within normal limits. There is no evidence of pancreatic mass or peripancreatic fluid. Spleen: The spleen is either surgically absent or very small in size. Splenule versus residual small spleen is visualized in the left upper quadrant measuring 2.1 cm. Gastrointestinal/Mesentery: No evidence of bowel obstruction or gross inflammatory changes.The appendix appears within normal limits. No evidence of mesenteric lymphadenopathy visualized on CT. Adrenals: Adrenal glands are unremarkable. Kidneys: The kidneys are in anatomic position. No evidence of nephrolithiasis. No evidence of hydronephrosis or perinephric fat stranding. The kidneys demonstrate symmetric fusion of IV contrast. The ureters are normal in caliber. No filling defect in the urinary  collecting system. Bladder: The urinary bladder is unremarkable. Reproductive organs:  The uterus and ovaries are within normal limits. Retroperitoneal/Lymph Nodes/Vasculature: No retroperitoneal adenopathy is identified. The abdominal vasculature is unremarkable.   Bony Structures:  Sclerotic lesion is visualized in the anterior right iliac wing measures 1.2 cm. Additional 2 subcentimeter sclerotic lesions are are also visualized in the right iliac wing. No lytic lesions are visualized. No acute fracture is visualized.     Impression: Impression: 1.3 cm and 1 cm homogeneously hyperenhancing lesions in the posterior hepatic lobe, segment 6 and 7 as described above. These may represent small flash filling hemangiomas. Hypervascular metastasis cannot be excluded. There is an additional slightly hyperenhancing 2.3 cm exophytic subcapsular lesion off of the posterior right hepatic lobe, indeterminate. Recommend correlation with contrast-enhanced hepatic protocol MRI and/or PET/CT for further evaluation.  Cholelithiasis. 3 sclerotic lesions in the right iliac wing measure up to 1.2 cm. Cannot exclude blastic metastatic disease. Follow-up with bone scan or PET/CT suggested. Electronically Signed: Yonatan Villa MD  11/22/2023 4:04 PM EST  Workstation ID: [id number]     Assessment / Plan      Assessment/Plan:   1. Liver lesion (Primary)  -Noted incidentally on recent CT scan  -Unclear if these are hemangiomas versus metastatic deposit  -Discussed the case with Dr. Briones who felt after reviewing the CT scans that the abnormality on her colonoscopy was likely related to a lipoma  -Will get MRI abdomen to further evaluate the liver lesions and may need potential biopsy based off results  -Will get CBC, CMP, CEA today  -     CBC & Differential; Future  -     CEA; Future  -     Comprehensive Metabolic Panel; Future  -     MRI Abdomen With & Without Contrast; Future    2. Bone lesion  -Unclear etiology on recent CT scan  -Will get MRI of the pelvis to further evaluate the lesions  -May need to consider biopsy in the future  -     MRI Pelvis With & Without Contrast; Future           Follow Up:   Follow-up after scans     Derek Bowman MD  Hematology and Oncology     Please note that portions of this note may have been completed with a voice recognition program. Efforts were made to edit the dictations, but occasionally words are mistranscribed.

## 2023-12-09 LAB — CEA SERPL-MCNC: 0.79 NG/ML

## 2023-12-16 ENCOUNTER — HOSPITAL ENCOUNTER (OUTPATIENT)
Dept: MRI IMAGING | Facility: HOSPITAL | Age: 63
Discharge: HOME | End: 2023-12-16
Payer: COMMERCIAL

## 2023-12-16 DIAGNOSIS — M89.9 BONE LESION: ICD-10-CM

## 2023-12-16 DIAGNOSIS — K76.9 LIVER LESION: ICD-10-CM

## 2023-12-16 PROCEDURE — 74183 MRI ABD W/O CNTR FLWD CNTR: CPT

## 2023-12-16 PROCEDURE — 0 GADOBENATE DIMEGLUMINE 529 MG/ML SOLUTION: Performed by: INTERNAL MEDICINE

## 2023-12-16 PROCEDURE — 72197 MRI PELVIS W/O & W/DYE: CPT

## 2023-12-16 PROCEDURE — A9577 INJ MULTIHANCE: HCPCS | Performed by: INTERNAL MEDICINE

## 2023-12-16 RX ADMIN — GADOBENATE DIMEGLUMINE 12 ML: 529 INJECTION, SOLUTION INTRAVENOUS at 09:09

## 2023-12-17 ENCOUNTER — HOSPITAL ENCOUNTER (OUTPATIENT)
Dept: MRI IMAGING | Facility: HOSPITAL | Age: 63
Discharge: HOME | End: 2023-12-17
Payer: COMMERCIAL

## 2023-12-18 ENCOUNTER — OFFICE VISIT (OUTPATIENT)
Dept: ONCOLOGY | Facility: CLINIC | Age: 63
End: 2023-12-18
Payer: COMMERCIAL

## 2023-12-18 VITALS
HEIGHT: 65 IN | DIASTOLIC BLOOD PRESSURE: 89 MMHG | HEART RATE: 80 BPM | SYSTOLIC BLOOD PRESSURE: 163 MMHG | WEIGHT: 137 LBS | TEMPERATURE: 97.9 F | BODY MASS INDEX: 22.82 KG/M2

## 2023-12-18 DIAGNOSIS — R93.5 ABNORMAL MRI OF ABDOMEN: Primary | ICD-10-CM

## 2023-12-18 PROCEDURE — 99214 OFFICE O/P EST MOD 30 MIN: CPT | Performed by: INTERNAL MEDICINE

## 2023-12-18 NOTE — PROGRESS NOTES
Follow Up Office Visit      Date: 2023     Patient Name: Kamran Francisco  MRN: [id number]  : 1960  Referring Physician: Robert Briones     Chief Complaint: Follow-up for liver lesions and right hip lesion     History of Present Illness: Kamran Francisco is a pleasant 63 y.o. female with a past medical history of hyperlipidemia and anxiety who presents today for evaluation of liver and bone lesion. The patient is accompanied by their sister who contributes to the history of their care.  Patient underwent screening colonoscopy in 2023.  There was a abnormal appearance of the right colon with 1 fold distal to the ileocecal valve was concerning for submucosal lesion.  She underwent a CT abdomen/pelvis which noted 3 sclerotic lesions in the right iliac wing as well as some small lesions in the liver that were indeterminate for potentially hemangiomas versus potentially metastasis.  She is anxious about the scans but otherwise doing well.  Denies any pain or discomfort.  Denies any significant smoking history.  Up-to-date on mammograms and high risk breast MRIs.  Otherwise she feels well has no major concerns or complaints    Interval History:  Presents clinic for follow-up.  Continues to do well.  Denies any abdominal pain or discomfort.  Denies any bone pain or discomfort    Oncology History:    Oncology/Hematology History    No history exists.       Subjective      Review of Systems:   Constitutional: Negative for fevers, chills, or weight loss  Eyes: Negative for blurred vision or discharge         Ear/Nose/Throat: Negative for difficulty swallowing, sore throat, LAD                                                       Respiratory: Negative for cough, SOA, wheezing                                                                                        Cardiovascular: Negative for chest pain or palpitations                                                                 "  Gastrointestinal: Negative for nausea, vomiting or diarrhea                                                                     Genitourinary: Negative for dysuria or hematuria                                                                                           Musculoskeletal: Negative for any joint pains or muscle aches                                                                        Neurologic: Negative for any weakness, headaches, dizziness                                                                         Hematologic: Negative for any easy bleeding or bruising                                                                                   Psychiatric: Negative for anxiety or depression                          Past Medical History/Past Surgical History/ Family History/ Social History: Reviewed by me and unchanged from my previous documentation done on December 2023.     Medications:     Current Outpatient Medications:     atorvastatin (LIPITOR) 10 MG tablet, , Disp: , Rfl:     FLUoxetine (PROzac) 10 MG capsule, , Disp: , Rfl:     Allergies:   No Known Allergies    Objective     Physical Exam:  Vital Signs:   Vitals:    12/18/23 1516   BP: 163/89   Pulse: 80   Temp: 97.9 °F (36.6 °C)   TempSrc: Infrared   Weight: 62.1 kg (137 lb)   Height: 165.1 cm (65\")   PainSc: 0-No pain     Pain Score    12/18/23 1516   PainSc: 0-No pain     ECOG Performance Status: 0 - Asymptomatic    Constitutional: NAD, ECOG 0  Eyes: PERRLA, scleral anicteric  ENT: No LAD, no thyromegaly  Respiratory: CTAB, no wheezing, rales, rhonchi  Cardiovascular: RRR, no murmurs, pulses 2+ bilaterally  Abdomen: soft, NT/ND, no HSM  Musculoskeletal: strength 5/5 bilaterally, no c/c/e  Neurologic: A&O x 3, CN II-XII intact grossly    Results Review:   Lab on 12/08/2023   Component Date Value Ref Range Status    CEA 12/08/2023 0.79  ng/mL Final    Glucose 12/08/2023 98  65 - 99 mg/dL Final    BUN 12/08/2023 15  8 - 23 mg/dL Final    " Creatinine 12/08/2023 0.81  0.57 - 1.00 mg/dL Final    Sodium 12/08/2023 143  136 - 145 mmol/L Final    Potassium 12/08/2023 3.8  3.5 - 5.2 mmol/L Final    Chloride 12/08/2023 106  98 - 107 mmol/L Final    CO2 12/08/2023 27.0  22.0 - 29.0 mmol/L Final    Calcium 12/08/2023 9.4  8.6 - 10.5 mg/dL Final    Total Protein 12/08/2023 7.5  6.0 - 8.5 g/dL Final    Albumin 12/08/2023 4.5  3.5 - 5.2 g/dL Final    ALT (SGPT) 12/08/2023 18  1 - 33 U/L Final    AST (SGOT) 12/08/2023 24  1 - 32 U/L Final    Alkaline Phosphatase 12/08/2023 85  39 - 117 U/L Final    Total Bilirubin 12/08/2023 0.6  0.0 - 1.2 mg/dL Final    Globulin 12/08/2023 3.0  gm/dL Final    Calculated Result    A/G Ratio 12/08/2023 1.5  g/dL Final    BUN/Creatinine Ratio 12/08/2023 18.5  7.0 - 25.0 Final    Anion Gap 12/08/2023 10.0  5.0 - 15.0 mmol/L Final    eGFR 12/08/2023 81.7  >60.0 mL/min/1.73 Final    WBC 12/08/2023 9.77  3.40 - 10.80 10*3/mm3 Final    RBC 12/08/2023 4.30  3.77 - 5.28 10*6/mm3 Final    Hemoglobin 12/08/2023 13.0  12.0 - 15.9 g/dL Final    Hematocrit 12/08/2023 41.2  34.0 - 46.6 % Final    MCV 12/08/2023 95.8  79.0 - 97.0 fL Final    MCH 12/08/2023 30.2  26.6 - 33.0 pg Final    MCHC 12/08/2023 31.6  31.5 - 35.7 g/dL Final    RDW 12/08/2023 13.0  12.3 - 15.4 % Final    RDW-SD 12/08/2023 45.7  37.0 - 54.0 fl Final    MPV 12/08/2023 11.7  6.0 - 12.0 fL Final    Platelets 12/08/2023 258  140 - 450 10*3/mm3 Final    Neutrophil % 12/08/2023 45.1  42.7 - 76.0 % Final    Lymphocyte % 12/08/2023 41.0  19.6 - 45.3 % Final    Monocyte % 12/08/2023 9.7  5.0 - 12.0 % Final    Eosinophil % 12/08/2023 3.1  0.3 - 6.2 % Final    Basophil % 12/08/2023 0.9  0.0 - 1.5 % Final    Immature Grans % 12/08/2023 0.2  0.0 - 0.5 % Final    Neutrophils, Absolute 12/08/2023 4.40  1.70 - 7.00 10*3/mm3 Final    Lymphocytes, Absolute 12/08/2023 4.01 (H)  0.70 - 3.10 10*3/mm3 Final    Monocytes, Absolute 12/08/2023 0.95 (H)  0.10 - 0.90 10*3/mm3 Final    Eosinophils,  Absolute 12/08/2023 0.30  0.00 - 0.40 10*3/mm3 Final    Basophils, Absolute 12/08/2023 0.09  0.00 - 0.20 10*3/mm3 Final    Immature Grans, Absolute 12/08/2023 0.02  0.00 - 0.05 10*3/mm3 Final    nRBC 12/08/2023 0.0  0.0 - 0.2 /100 WBC Final       CT Abdomen Pelvis With Contrast    Result Date: 11/22/2023  Narrative: CT ABDOMEN PELVIS W CONTRAST Date of Exam: 11/21/2023 3:55 PM EST Indication: D37.4. Colonic mass on CT colonoscopy. Comparison: None available. Technique: Axial CT images were obtained of the abdomen and pelvis following the uneventful intravenous administration of Isovue-300, 85 mL and Redicat 450 mL orally. Reconstructed coronal and sagittal images were also obtained. Automated exposure control and iterative construction methods were used. Findings: Lung Bases: The visualized lung bases and lower mediastinal structures are unremarkable. Peritoneum: No free intraperitoneal air or fluid. Abdominal wall: Unremarkable. Liver: The liver is within normal limits in size and contour. There is a 1.3 cm homogeneously hyperenhancing lesion in the posterior hepatic lobe, and the inferior portion of hepatic segment 7. More inferiorly, in segment 6, there is a second homogeneously hyperenhancing lesion measuring 1 cm. There is adjacent capsular retraction. Surrounding poorly defined hyperenhancement is also visualized possibly on the basis of altered perfusion. There is a third exophytic subcapsular slightly hypoenhancing lesion off of the posterior aspect of the right hepatic lobe measuring 2.3 cm. The portal vein is patent. Biliary/Gallbladder: 1.8 cm gallstone is visualized. No pericholecystic inflammatory changes are visualized. The gallbladder is not abnormally distended. The biliary tree is nondilated. Pancreas: Pancreas is within normal limits. There is no evidence of pancreatic mass or peripancreatic fluid. Spleen: The spleen is either surgically absent or very small in size. Splenule versus residual small  spleen is visualized in the left upper quadrant measuring 2.1 cm. Gastrointestinal/Mesentery: No evidence of bowel obstruction or gross inflammatory changes.The appendix appears within normal limits. No evidence of mesenteric lymphadenopathy visualized on CT. Adrenals: Adrenal glands are unremarkable. Kidneys: The kidneys are in anatomic position. No evidence of nephrolithiasis. No evidence of hydronephrosis or perinephric fat stranding. The kidneys demonstrate symmetric fusion of IV contrast. The ureters are normal in caliber. No filling defect in the urinary  collecting system. Bladder: The urinary bladder is unremarkable. Reproductive organs:  The uterus and ovaries are within normal limits. Retroperitoneal/Lymph Nodes/Vasculature: No retroperitoneal adenopathy is identified. The abdominal vasculature is unremarkable.   Bony Structures:  Sclerotic lesion is visualized in the anterior right iliac wing measures 1.2 cm. Additional 2 subcentimeter sclerotic lesions are are also visualized in the right iliac wing. No lytic lesions are visualized. No acute fracture is visualized.     Impression: Impression: 1.3 cm and 1 cm homogeneously hyperenhancing lesions in the posterior hepatic lobe, segment 6 and 7 as described above. These may represent small flash filling hemangiomas. Hypervascular metastasis cannot be excluded. There is an additional slightly hyperenhancing 2.3 cm exophytic subcapsular lesion off of the posterior right hepatic lobe, indeterminate. Recommend correlation with contrast-enhanced hepatic protocol MRI and/or PET/CT for further evaluation. Cholelithiasis. 3 sclerotic lesions in the right iliac wing measure up to 1.2 cm. Cannot exclude blastic metastatic disease. Follow-up with bone scan or PET/CT suggested. Electronically Signed: Yonatan Villa MD  11/22/2023 4:04 PM EST  Workstation ID: [id number]     Assessment / Plan      Assessment/Plan:   1. Abnormal MRI of abdomen (Primary)/2. Liver lesion  (Primary)  -Noted incidentally on recent CT scan  -Unclear if these are hemangiomas versus metastatic deposit  -Discussed the case with Dr. Briones who felt after reviewing the CT scans that the abnormality on her colonoscopy was likely related to a lipoma  -CEA within normal limits  -MRI abdomen discussed with Dr. Bailey and her liver lesions appear to be more hemangioma ache-like.  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 Bone lesion  -Unclear etiology on recent CT scan  -MRI most consistent with a bone island and nonconcerning for malignancy  -Will await official radiology read but unlikely will she need a biopsy or further workup       Follow Up:   Follow-up pending official radiology result     Derek Bowman MD  Hematology and Oncology     Please note that portions of this note may have been completed with a voice recognition program. Efforts were made to edit the dictations, but occasionally words are mistranscribed.

## 2023-12-21 DIAGNOSIS — R93.5 ABNORMAL MRI OF ABDOMEN: Primary | ICD-10-CM

## 2024-02-12 ENCOUNTER — TRANSCRIBE ORDERS (OUTPATIENT)
Dept: ADMINISTRATIVE | Facility: HOSPITAL | Age: 64
End: 2024-02-12
Payer: COMMERCIAL

## 2024-02-12 DIAGNOSIS — Z12.39 BREAST CANCER SCREENING, HIGH RISK PATIENT: Primary | ICD-10-CM

## 2024-02-13 ENCOUNTER — TRANSCRIBE ORDERS (OUTPATIENT)
Dept: ADMINISTRATIVE | Facility: HOSPITAL | Age: 64
End: 2024-02-13
Payer: COMMERCIAL

## 2024-02-13 DIAGNOSIS — Z12.39 BREAST CANCER SCREENING, HIGH RISK PATIENT: Primary | ICD-10-CM

## 2024-02-22 ENCOUNTER — HOSPITAL ENCOUNTER (OUTPATIENT)
Dept: MAMMOGRAPHY | Facility: HOSPITAL | Age: 64
Discharge: HOME | End: 2024-02-22
Admitting: INTERNAL MEDICINE
Payer: COMMERCIAL

## 2024-02-22 DIAGNOSIS — Z12.39 BREAST CANCER SCREENING, HIGH RISK PATIENT: ICD-10-CM

## 2024-02-22 PROCEDURE — 77066 DX MAMMO INCL CAD BI: CPT

## 2024-02-22 PROCEDURE — G0279 TOMOSYNTHESIS, MAMMO: HCPCS

## 2024-02-22 PROCEDURE — 77062 BREAST TOMOSYNTHESIS BI: CPT | Performed by: RADIOLOGY

## 2024-02-22 PROCEDURE — 77066 DX MAMMO INCL CAD BI: CPT | Performed by: RADIOLOGY

## 2024-06-20 ENCOUNTER — HOSPITAL ENCOUNTER (OUTPATIENT)
Dept: MRI IMAGING | Facility: HOSPITAL | Age: 64
Discharge: HOME | End: 2024-06-20
Admitting: INTERNAL MEDICINE
Payer: COMMERCIAL

## 2024-06-20 DIAGNOSIS — R93.5 ABNORMAL MRI OF ABDOMEN: ICD-10-CM

## 2024-06-20 PROCEDURE — 74183 MRI ABD W/O CNTR FLWD CNTR: CPT

## 2024-06-20 PROCEDURE — A9577 INJ MULTIHANCE: HCPCS | Performed by: INTERNAL MEDICINE

## 2024-06-20 PROCEDURE — 0 GADOBENATE DIMEGLUMINE 529 MG/ML SOLUTION: Performed by: INTERNAL MEDICINE

## 2024-06-20 RX ADMIN — GADOBENATE DIMEGLUMINE 13 ML: 529 INJECTION, SOLUTION INTRAVENOUS at 11:35

## 2024-06-24 ENCOUNTER — OFFICE VISIT (OUTPATIENT)
Dept: ONCOLOGY | Facility: CLINIC | Age: 64
End: 2024-06-24
Payer: COMMERCIAL

## 2024-06-24 VITALS
BODY MASS INDEX: 24.16 KG/M2 | HEIGHT: 65 IN | HEART RATE: 89 BPM | WEIGHT: 145 LBS | DIASTOLIC BLOOD PRESSURE: 77 MMHG | TEMPERATURE: 98.1 F | SYSTOLIC BLOOD PRESSURE: 152 MMHG | OXYGEN SATURATION: 96 %

## 2024-06-24 DIAGNOSIS — R93.5 ABNORMAL MRI OF ABDOMEN: Primary | ICD-10-CM

## 2024-06-24 PROCEDURE — 99214 OFFICE O/P EST MOD 30 MIN: CPT | Performed by: INTERNAL MEDICINE

## 2024-06-24 NOTE — PROGRESS NOTES
Follow Up Office Visit      Date: 2024     Patient Name: Kamran Francisco  MRN: [id number]  : 1960  Referring Physician: Robert Briones     Chief Complaint: Follow-up for liver lesions and right hip lesion     History of Present Illness: Kamran Francisco is a pleasant 63 y.o. female with a past medical history of hyperlipidemia and anxiety who presents today for evaluation of liver and bone lesion. The patient is accompanied by their sister who contributes to the history of their care.  Patient underwent screening colonoscopy in 2023.  There was a abnormal appearance of the right colon with 1 fold distal to the ileocecal valve was concerning for submucosal lesion.  She underwent a CT abdomen/pelvis which noted 3 sclerotic lesions in the right iliac wing as well as some small lesions in the liver that were indeterminate for potentially hemangiomas versus potentially metastasis.  She is anxious about the scans but otherwise doing well.  Denies any pain or discomfort.  Denies any significant smoking history.  Up-to-date on mammograms and high risk breast MRIs.  Otherwise she feels well has no major concerns or complaints     Interval History:  Presents to clinic for follow-up.  No major issues today.  Denies abdominal pain or discomfort.  Denies any bone pain.  Weight stable    Oncology History:    Oncology/Hematology History    No history exists.       Subjective      Review of Systems:   Constitutional: Negative for fevers, chills, or weight loss  Eyes: Negative for blurred vision or discharge         Ear/Nose/Throat: Negative for difficulty swallowing, sore throat, LAD                                                       Respiratory: Negative for cough, SOA, wheezing                                                                                        Cardiovascular: Negative for chest pain or palpitations                                                                 "  Gastrointestinal: Negative for nausea, vomiting or diarrhea                                                                     Genitourinary: Negative for dysuria or hematuria                                                                                           Musculoskeletal: Negative for any joint pains or muscle aches                                                                        Neurologic: Negative for any weakness, headaches, dizziness                                                                         Hematologic: Negative for any easy bleeding or bruising                                                                                   Psychiatric: Negative for anxiety or depression                          Past Medical History/Past Surgical History/ Family History/ Social History: Reviewed by me and unchanged from my previous documentation done on December 2023.     Medications:     Current Outpatient Medications:     atorvastatin (LIPITOR) 10 MG tablet, , Disp: , Rfl:     Allergies:   No Known Allergies    Objective     Physical Exam:  Vital Signs:   Vitals:    06/24/24 1422   BP: 152/77   Pulse: 89   Temp: 98.1 °F (36.7 °C)   TempSrc: Temporal   SpO2: 96%   Weight: 65.8 kg (145 lb)   Height: 165.1 cm (65\")   PainSc: 0-No pain     Pain Score    06/24/24 1422   PainSc: 0-No pain     ECOG Performance Status: 0 - Asymptomatic    Constitutional: NAD, ECOG 0  Eyes: PERRLA, scleral anicteric  ENT: No LAD, no thyromegaly  Respiratory: CTAB, no wheezing, rales, rhonchi  Cardiovascular: RRR, no murmurs, pulses 2+ bilaterally  Abdomen: soft, NT/ND, no HSM  Musculoskeletal: strength 5/5 bilaterally, no c/c/e  Neurologic: A&O x 3, CN II-XII intact grossly    Results Review:   No visits with results within 2 Week(s) from this visit.   Latest known visit with results is:   Lab on 12/08/2023   Component Date Value Ref Range Status    CEA 12/08/2023 0.79  ng/mL Final    Glucose 12/08/2023 98  65 - 99 " mg/dL Final    BUN 12/08/2023 15  8 - 23 mg/dL Final    Creatinine 12/08/2023 0.81  0.57 - 1.00 mg/dL Final    Sodium 12/08/2023 143  136 - 145 mmol/L Final    Potassium 12/08/2023 3.8  3.5 - 5.2 mmol/L Final    Chloride 12/08/2023 106  98 - 107 mmol/L Final    CO2 12/08/2023 27.0  22.0 - 29.0 mmol/L Final    Calcium 12/08/2023 9.4  8.6 - 10.5 mg/dL Final    Total Protein 12/08/2023 7.5  6.0 - 8.5 g/dL Final    Albumin 12/08/2023 4.5  3.5 - 5.2 g/dL Final    ALT (SGPT) 12/08/2023 18  1 - 33 U/L Final    AST (SGOT) 12/08/2023 24  1 - 32 U/L Final    Alkaline Phosphatase 12/08/2023 85  39 - 117 U/L Final    Total Bilirubin 12/08/2023 0.6  0.0 - 1.2 mg/dL Final    Globulin 12/08/2023 3.0  gm/dL Final    Calculated Result    A/G Ratio 12/08/2023 1.5  g/dL Final    BUN/Creatinine Ratio 12/08/2023 18.5  7.0 - 25.0 Final    Anion Gap 12/08/2023 10.0  5.0 - 15.0 mmol/L Final    eGFR 12/08/2023 81.7  >60.0 mL/min/1.73 Final    WBC 12/08/2023 9.77  3.40 - 10.80 10*3/mm3 Final    RBC 12/08/2023 4.30  3.77 - 5.28 10*6/mm3 Final    Hemoglobin 12/08/2023 13.0  12.0 - 15.9 g/dL Final    Hematocrit 12/08/2023 41.2  34.0 - 46.6 % Final    MCV 12/08/2023 95.8  79.0 - 97.0 fL Final    MCH 12/08/2023 30.2  26.6 - 33.0 pg Final    MCHC 12/08/2023 31.6  31.5 - 35.7 g/dL Final    RDW 12/08/2023 13.0  12.3 - 15.4 % Final    RDW-SD 12/08/2023 45.7  37.0 - 54.0 fl Final    MPV 12/08/2023 11.7  6.0 - 12.0 fL Final    Platelets 12/08/2023 258  140 - 450 10*3/mm3 Final    Neutrophil % 12/08/2023 45.1  42.7 - 76.0 % Final    Lymphocyte % 12/08/2023 41.0  19.6 - 45.3 % Final    Monocyte % 12/08/2023 9.7  5.0 - 12.0 % Final    Eosinophil % 12/08/2023 3.1  0.3 - 6.2 % Final    Basophil % 12/08/2023 0.9  0.0 - 1.5 % Final    Immature Grans % 12/08/2023 0.2  0.0 - 0.5 % Final    Neutrophils, Absolute 12/08/2023 4.40  1.70 - 7.00 10*3/mm3 Final    Lymphocytes, Absolute 12/08/2023 4.01 (H)  0.70 - 3.10 10*3/mm3 Final    Monocytes, Absolute 12/08/2023  0.95 (H)  0.10 - 0.90 10*3/mm3 Final    Eosinophils, Absolute 12/08/2023 0.30  0.00 - 0.40 10*3/mm3 Final    Basophils, Absolute 12/08/2023 0.09  0.00 - 0.20 10*3/mm3 Final    Immature Grans, Absolute 12/08/2023 0.02  0.00 - 0.05 10*3/mm3 Final    nRBC 12/08/2023 0.0  0.0 - 0.2 /100 WBC Final       MRI Abdomen With & Without Contrast    Result Date: 6/21/2024  Narrative: MRI ABDOMEN W WO CONTRAST Date of Exam: 6/20/2024 10:09 AM EDT Indication: evaluate splenule and liver hemangioma.  Comparison: MRI abdomen 12/16/2023, CT abdomen and pelvis with contrast 11/21/2023 Technique:  Routine multiplanar/multisequence images of the abdomen were obtained before and after the uneventful administration of 13 mL Multihance. Findings: Lower chest demonstrates normal heart size. Negative for pericardial effusion or pleural effusion. Liver is normal in size and contour. Negative for hepatic steatosis. Adjacent to the posterior right hepatic lobe at segment 6 there is a extrahepatic lesion which measures 2.5 x 1.5 cm, unchanged (2/6). This again has appearance suggesting ectopic splenic tissue. Several other areas of ectopic splenic tissue are again noted in the left upper quadrant with similar imaging signal characteristics for example measuring 3 x 1.9 cm (16/41). Within the right hepatic lobe at segment 6 there is a stable T2 hyperintense 1.4 cm lesion consistent with a hemangioma (18/38). 2 smaller adjacent lesions more caudally within the right hepatic lobe at segment 6 are also have imaging and enhancement characteristics most suggestive of hemangiomas measuring 0.9 x 0.9 cm and 1.5 x 0.8 cm (5/22). One of these lesions has mild overlying capsular retraction which suggests sclerosed hemangioma, unchanged (2/15). No new liver lesion. Cholelithiasis. No pericholecystic inflammation. No biliary dilatation Prior splenectomy. The adrenal glands are normal. Kidneys are symmetrically enhancing. No hydronephrosis or hydroureter. The  pancreas is normal in T1 signal intensity. No findings of pancreatitis. No pancreatic duct dilatation. No dilated bowel loops in the upper abdomen. The portal vein, splenic vein, and superior mesenteric vein are patent. Abdominal aorta and branch vasculature patent.     Impression: Impression: 1. Prior splenectomy with stable findings of splenosis with multiple areas of nodular splenic tissue in the left upper quadrant and focal area of splenic tissue abutting posterior right hepatic lobe measuring 2.5 cm, unchanged. 2. Three lesions in right hepatic lobe are unchanged most suggestive of hemangiomas. 3. Cholelithiasis. Electronically Signed: John Castillo MD  6/21/2024 12:29 PM EDT  Workstation ID: [id number]     Assessment / Plan      Assessment/Plan:   1. Abnormal MRI of abdomen (Primary)/2. Liver lesion (Primary)  -Noted incidentally on recent CT scan  -Unclear if these are hemangiomas versus metastatic deposit  -Discussed the case with Dr. Briones who felt after reviewing the CT scans that the abnormality on her colonoscopy was likely related to a lipoma  -CEA within normal limits  -MRI abdomen discussed with Dr. Bailey and her liver lesions appear to be more hemangioma like.  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  Stable splenules with no increase in size  - No further oncologic workup required     2. Bone lesion  -Unclear etiology on recent CT scan  -MRI most consistent with a bone island and nonconcerning for malignancy  -No further workup required         Follow Up:   Follow-up as needed     Derek Bowman MD  Hematology and Oncology     Please note that portions of this note may have been completed with a voice recognition program. Efforts were made to edit the dictations, but occasionally  words are mistranscribed.

## 2024-08-20 ENCOUNTER — HOSPITAL ENCOUNTER (OUTPATIENT)
Dept: MRI IMAGING | Facility: HOSPITAL | Age: 64
Discharge: HOME | End: 2024-08-20
Admitting: INTERNAL MEDICINE
Payer: COMMERCIAL

## 2024-08-20 DIAGNOSIS — Z12.39 BREAST CANCER SCREENING, HIGH RISK PATIENT: ICD-10-CM

## 2024-08-20 PROCEDURE — A9577 INJ MULTIHANCE: HCPCS | Performed by: INTERNAL MEDICINE

## 2024-08-20 PROCEDURE — 0 GADOBENATE DIMEGLUMINE 529 MG/ML SOLUTION: Performed by: INTERNAL MEDICINE

## 2024-08-20 PROCEDURE — 77049 MRI BREAST C-+ W/CAD BI: CPT

## 2024-08-20 RX ADMIN — GADOBENATE DIMEGLUMINE 13 ML: 529 INJECTION, SOLUTION INTRAVENOUS at 11:46

## 2024-09-11 ENCOUNTER — TRANSCRIBE ORDERS (OUTPATIENT)
Dept: ADMINISTRATIVE | Facility: HOSPITAL | Age: 64
End: 2024-09-11
Payer: COMMERCIAL

## 2024-09-11 DIAGNOSIS — R92.8 ABNORMAL MAMMOGRAM: Primary | ICD-10-CM

## 2024-09-18 ENCOUNTER — TRANSCRIBE ORDERS (OUTPATIENT)
Dept: ADMINISTRATIVE | Facility: HOSPITAL | Age: 64
End: 2024-09-18
Payer: COMMERCIAL

## 2024-09-18 DIAGNOSIS — R92.8 ABNORMAL MAMMOGRAM: Primary | ICD-10-CM

## 2024-12-17 NOTE — TELEPHONE ENCOUNTER
Called patient with MRI Breast Biopsy details Recommended MRI biopsy on 9/23 at 1:00, to arrive at 12:30. Procedure described. Pt not on BT.  Pt expressed understanding and was encouraged to call with any further questions or concerns.      Expiration Date (Optional): 09/2025 Detail Level: Zone Samples Given: Opzelura Lot/Batch Number (Optional): [id number]

## 2025-02-24 ENCOUNTER — HOSPITAL ENCOUNTER (OUTPATIENT)
Facility: HOSPITAL | Age: 65
Discharge: HOME | End: 2025-02-24
Admitting: INTERNAL MEDICINE
Payer: COMMERCIAL

## 2025-02-24 DIAGNOSIS — R92.8 ABNORMAL MAMMOGRAM: ICD-10-CM

## 2025-02-24 PROCEDURE — 77063 BREAST TOMOSYNTHESIS BI: CPT

## 2025-02-24 PROCEDURE — 77067 SCR MAMMO BI INCL CAD: CPT

## 2025-02-25 PROCEDURE — 77067 SCR MAMMO BI INCL CAD: CPT | Performed by: RADIOLOGY

## 2025-02-25 PROCEDURE — 77063 BREAST TOMOSYNTHESIS BI: CPT | Performed by: RADIOLOGY
